# Patient Record
(demographics unavailable — no encounter records)

---

## 2024-11-05 NOTE — HISTORY OF PRESENT ILLNESS
[de-identified] : Very pleasant gentleman presents for evaluation of his cervical MRI.  He states his right arm is still painful he states it is mostly painful in the evening hours morning hours but during the day states he is essentially asymptomatic I think part of this may be more muscular tenderness rotator cuff issue as well he still remains an active smoker no weakness but he does state his right upper extremity again particularly in the evening and morning hours is very painful.  KIEL questionnaire is negative. [Stable] : stable [3] : a current pain level of 3/10 [1] : an average pain level of 1/10 [0] : a minimum pain level of 0/10 [10] : a maximum pain level of 10/10 [Intermit.] : ~He/She~ states the symptoms seem to be intermittent [Bending] : worsened by bending [Lifting] : worsened by lifting [Rest] : relieved by rest [Incontinence] : no incontinence [Loss of Dexterity] : good dexterity [Urinary Ret.] : no urinary retention [de-identified] : Evening hours prolonged rest [de-identified] : Activity

## 2024-11-05 NOTE — DISCUSSION/SUMMARY
[de-identified] : Pleasant gentleman presents for interpretation of his cervical MRI.  On today's clinical exam I think there is a component of this that is 1 cervical/radiculopathy as well as rotator cuff physical therapy is going to be initiated for soft tissue modalities rotator cuff strengthening.  Going to recommend Medrol Dosepak and naproxen from a treatment perspective.  From a diagnostic perspective I think ordering a right shoulder MRI to evaluate rotator cuff integrity is reasonable.  Patient is suzette follow-up in 6 weeks.  He is also can be referred to pain management for cervical epidural injection.  Prior to committing to cervical surgery we did discuss and demonstrated an ACDF I would like to exhaust all measures.  I have also discussed smoking cessation with him he is currently smoking a pack every 1 to 2 days.  Recommend following up with his PCP for smoking cessation efforts excetra.

## 2024-11-05 NOTE — PHYSICAL EXAM
[Antalgic] : antalgic [de-identified] : CONSTITUTIONAL:  Patient is a very pleasant individual who is well-nourished and appears stated age.  PSYCHIATRIC:  Alert and oriented times three and in no apparent distress, and participates with orthopedic evaluation well. HEAD:  Atraumatic and  nonsyndromic in appearance. EENT: No thyromegaly, EOMI. RESPIRATORY:  Respiratory rate is regular, not dyspneic on examination. LYMPHATICS:  There is no cervical or axillary lymphadenopathy. INTEGUMENTARY:  Skin is clean, dry, and intact about the bilateral upper extremities and cervical spine.  VASCULAR:   There is brisk capillary refill about the bilateral upper extremities and radial pulses are 2/4.  NEUROLOGIC:  Negative L'hirmitte, negative Spurling's sign. There are no pathologic reflexes. There is a decrease in sensation of the right upper extremities on manual examination .  Deep tendon reflexes are well-maintained at +2/4 of the bilateral upper extremities and are symmetric. MUSCULOSKELETAL:  There is no visible muscular atrophy.  Manual motor strength is well maintained in the bilateral upper extremities.  Cervical range of motion is well maintained.  The patient ambulates in a non-myelopathic manner. Normal secondary orthopaedic exam of left shoulders, elbows and hands.  Elbow flexion and extension, wrist extension, finger flexion and abduction are well maintained.  Right shoulder demonstrates mild rotator cuff tendinopathy.    [de-identified] : Cervical MRI demonstrates 4 levels of consecutive cervical spondylosis at [id number] and 6 7 spinal cord is healthy there is no myelomalacia changes and this is directly compared to x-rays that again shows cervical spondylosis to varying degrees throughout these spinal levels.